# Patient Record
Sex: MALE | Race: BLACK OR AFRICAN AMERICAN | NOT HISPANIC OR LATINO | URBAN - METROPOLITAN AREA
[De-identification: names, ages, dates, MRNs, and addresses within clinical notes are randomized per-mention and may not be internally consistent; named-entity substitution may affect disease eponyms.]

---

## 2024-08-09 ENCOUNTER — APPOINTMENT (OUTPATIENT)
Dept: RADIOLOGY | Facility: HOSPITAL | Age: 64
End: 2024-08-09
Payer: COMMERCIAL

## 2024-08-09 ENCOUNTER — HOSPITAL ENCOUNTER (EMERGENCY)
Facility: HOSPITAL | Age: 64
Discharge: HOME | End: 2024-08-09
Attending: EMERGENCY MEDICINE
Payer: COMMERCIAL

## 2024-08-09 ENCOUNTER — APPOINTMENT (OUTPATIENT)
Dept: CARDIOLOGY | Facility: HOSPITAL | Age: 64
End: 2024-08-09
Payer: COMMERCIAL

## 2024-08-09 VITALS
WEIGHT: 190 LBS | RESPIRATION RATE: 18 BRPM | SYSTOLIC BLOOD PRESSURE: 149 MMHG | OXYGEN SATURATION: 96 % | HEART RATE: 55 BPM | HEIGHT: 66 IN | DIASTOLIC BLOOD PRESSURE: 70 MMHG | TEMPERATURE: 97 F | BODY MASS INDEX: 30.53 KG/M2

## 2024-08-09 DIAGNOSIS — M25.511 ACUTE PAIN OF RIGHT SHOULDER: Primary | ICD-10-CM

## 2024-08-09 LAB
ALBUMIN SERPL BCP-MCNC: 4 G/DL (ref 3.4–5)
ALP SERPL-CCNC: 86 U/L (ref 33–136)
ALT SERPL W P-5'-P-CCNC: 12 U/L (ref 10–52)
ANION GAP SERPL CALC-SCNC: 12 MMOL/L (ref 10–20)
AST SERPL W P-5'-P-CCNC: 14 U/L (ref 9–39)
ATRIAL RATE: 60 BPM
BASOPHILS # BLD AUTO: 0.09 X10*3/UL (ref 0–0.1)
BASOPHILS NFR BLD AUTO: 0.7 %
BILIRUB SERPL-MCNC: 0.3 MG/DL (ref 0–1.2)
BUN SERPL-MCNC: 11 MG/DL (ref 6–23)
CALCIUM SERPL-MCNC: 9 MG/DL (ref 8.6–10.3)
CARDIAC TROPONIN I PNL SERPL HS: 3 NG/L (ref 0–20)
CHLORIDE SERPL-SCNC: 105 MMOL/L (ref 98–107)
CO2 SERPL-SCNC: 24 MMOL/L (ref 21–32)
CREAT SERPL-MCNC: 0.96 MG/DL (ref 0.5–1.3)
CRP SERPL-MCNC: 1.11 MG/DL
EGFRCR SERPLBLD CKD-EPI 2021: 90 ML/MIN/1.73M*2
EOSINOPHIL # BLD AUTO: 0.22 X10*3/UL (ref 0–0.7)
EOSINOPHIL NFR BLD AUTO: 1.8 %
ERYTHROCYTE [DISTWIDTH] IN BLOOD BY AUTOMATED COUNT: 14.9 % (ref 11.5–14.5)
ERYTHROCYTE [SEDIMENTATION RATE] IN BLOOD BY WESTERGREN METHOD: 15 MM/H (ref 0–20)
GLUCOSE SERPL-MCNC: 119 MG/DL (ref 74–99)
HCT VFR BLD AUTO: 46.6 % (ref 41–52)
HGB BLD-MCNC: 15.6 G/DL (ref 13.5–17.5)
IMM GRANULOCYTES # BLD AUTO: 0.03 X10*3/UL (ref 0–0.7)
IMM GRANULOCYTES NFR BLD AUTO: 0.2 % (ref 0–0.9)
LYMPHOCYTES # BLD AUTO: 4.43 X10*3/UL (ref 1.2–4.8)
LYMPHOCYTES NFR BLD AUTO: 36.7 %
MCH RBC QN AUTO: 29.2 PG (ref 26–34)
MCHC RBC AUTO-ENTMCNC: 33.5 G/DL (ref 32–36)
MCV RBC AUTO: 87 FL (ref 80–100)
MONOCYTES # BLD AUTO: 0.72 X10*3/UL (ref 0.1–1)
MONOCYTES NFR BLD AUTO: 6 %
NEUTROPHILS # BLD AUTO: 6.58 X10*3/UL (ref 1.2–7.7)
NEUTROPHILS NFR BLD AUTO: 54.6 %
NRBC BLD-RTO: 0 /100 WBCS (ref 0–0)
P AXIS: 57 DEGREES
P OFFSET: 188 MS
P ONSET: 129 MS
PLATELET # BLD AUTO: 244 X10*3/UL (ref 150–450)
POTASSIUM SERPL-SCNC: 3.9 MMOL/L (ref 3.5–5.3)
PR INTERVAL: 182 MS
PROT SERPL-MCNC: 6.6 G/DL (ref 6.4–8.2)
Q ONSET: 220 MS
QRS COUNT: 10 BEATS
QRS DURATION: 82 MS
QT INTERVAL: 394 MS
QTC CALCULATION(BAZETT): 394 MS
QTC FREDERICIA: 394 MS
R AXIS: 79 DEGREES
RBC # BLD AUTO: 5.35 X10*6/UL (ref 4.5–5.9)
SODIUM SERPL-SCNC: 137 MMOL/L (ref 136–145)
T AXIS: 61 DEGREES
T OFFSET: 417 MS
VENTRICULAR RATE: 60 BPM
WBC # BLD AUTO: 12.1 X10*3/UL (ref 4.4–11.3)

## 2024-08-09 PROCEDURE — 85025 COMPLETE CBC W/AUTO DIFF WBC: CPT | Performed by: PHYSICIAN ASSISTANT

## 2024-08-09 PROCEDURE — 2500000001 HC RX 250 WO HCPCS SELF ADMINISTERED DRUGS (ALT 637 FOR MEDICARE OP): Performed by: PHYSICIAN ASSISTANT

## 2024-08-09 PROCEDURE — 73030 X-RAY EXAM OF SHOULDER: CPT | Mod: RT

## 2024-08-09 PROCEDURE — 84075 ASSAY ALKALINE PHOSPHATASE: CPT | Performed by: PHYSICIAN ASSISTANT

## 2024-08-09 PROCEDURE — 99285 EMERGENCY DEPT VISIT HI MDM: CPT | Mod: 25

## 2024-08-09 PROCEDURE — 2500000002 HC RX 250 W HCPCS SELF ADMINISTERED DRUGS (ALT 637 FOR MEDICARE OP, ALT 636 FOR OP/ED): Performed by: PHYSICIAN ASSISTANT

## 2024-08-09 PROCEDURE — 86140 C-REACTIVE PROTEIN: CPT | Performed by: PHYSICIAN ASSISTANT

## 2024-08-09 PROCEDURE — 72125 CT NECK SPINE W/O DYE: CPT

## 2024-08-09 PROCEDURE — 72125 CT NECK SPINE W/O DYE: CPT | Performed by: RADIOLOGY

## 2024-08-09 PROCEDURE — 96374 THER/PROPH/DIAG INJ IV PUSH: CPT

## 2024-08-09 PROCEDURE — 73030 X-RAY EXAM OF SHOULDER: CPT | Mod: RIGHT SIDE | Performed by: RADIOLOGY

## 2024-08-09 PROCEDURE — 84484 ASSAY OF TROPONIN QUANT: CPT | Performed by: PHYSICIAN ASSISTANT

## 2024-08-09 PROCEDURE — 2500000004 HC RX 250 GENERAL PHARMACY W/ HCPCS (ALT 636 FOR OP/ED): Performed by: PHYSICIAN ASSISTANT

## 2024-08-09 PROCEDURE — 85652 RBC SED RATE AUTOMATED: CPT | Performed by: PHYSICIAN ASSISTANT

## 2024-08-09 PROCEDURE — 93005 ELECTROCARDIOGRAM TRACING: CPT

## 2024-08-09 PROCEDURE — 36415 COLL VENOUS BLD VENIPUNCTURE: CPT | Performed by: PHYSICIAN ASSISTANT

## 2024-08-09 RX ORDER — OXYCODONE AND ACETAMINOPHEN 5; 325 MG/1; MG/1
1 TABLET ORAL ONCE
Status: COMPLETED | OUTPATIENT
Start: 2024-08-09 | End: 2024-08-09

## 2024-08-09 RX ORDER — LIDOCAINE 560 MG/1
1 PATCH PERCUTANEOUS; TOPICAL; TRANSDERMAL
COMMUNITY
Start: 2024-08-08 | End: 2024-08-13

## 2024-08-09 RX ORDER — OMEPRAZOLE 20 MG/1
20 CAPSULE, DELAYED RELEASE ORAL
COMMUNITY

## 2024-08-09 RX ORDER — KETOROLAC TROMETHAMINE 30 MG/ML
15 INJECTION, SOLUTION INTRAMUSCULAR; INTRAVENOUS ONCE
Status: DISCONTINUED | OUTPATIENT
Start: 2024-08-09 | End: 2024-08-09

## 2024-08-09 RX ORDER — HYDROXYZINE HYDROCHLORIDE 25 MG/1
25 TABLET, FILM COATED ORAL 2 TIMES DAILY PRN
COMMUNITY
Start: 2022-10-06

## 2024-08-09 RX ORDER — AMLODIPINE BESYLATE 10 MG/1
10 TABLET ORAL DAILY
COMMUNITY

## 2024-08-09 RX ORDER — TAMSULOSIN HYDROCHLORIDE 0.4 MG/1
0.4 CAPSULE ORAL DAILY
COMMUNITY

## 2024-08-09 RX ORDER — CYCLOBENZAPRINE HCL 10 MG
10 TABLET ORAL ONCE
Status: COMPLETED | OUTPATIENT
Start: 2024-08-09 | End: 2024-08-09

## 2024-08-09 RX ORDER — PRAVASTATIN SODIUM 40 MG/1
40 TABLET ORAL DAILY
COMMUNITY

## 2024-08-09 RX ORDER — ASPIRIN 81 MG/1
81 TABLET ORAL DAILY
COMMUNITY

## 2024-08-09 RX ORDER — CYCLOBENZAPRINE HCL 10 MG
10 TABLET ORAL EVERY 8 HOURS PRN
COMMUNITY
Start: 2024-08-08 | End: 2024-08-13

## 2024-08-09 RX ORDER — DIAZEPAM 5 MG/1
5 TABLET ORAL ONCE
Status: COMPLETED | OUTPATIENT
Start: 2024-08-09 | End: 2024-08-09

## 2024-08-09 RX ORDER — DULOXETIN HYDROCHLORIDE 60 MG/1
60 CAPSULE, DELAYED RELEASE ORAL
COMMUNITY

## 2024-08-09 RX ORDER — OXYCODONE AND ACETAMINOPHEN 5; 325 MG/1; MG/1
1 TABLET ORAL EVERY 6 HOURS PRN
Qty: 5 TABLET | Refills: 0 | Status: SHIPPED | OUTPATIENT
Start: 2024-08-09 | End: 2024-08-12

## 2024-08-09 RX ORDER — CARVEDILOL 25 MG/1
25 TABLET ORAL 2 TIMES DAILY
COMMUNITY

## 2024-08-09 ASSESSMENT — PAIN - FUNCTIONAL ASSESSMENT
PAIN_FUNCTIONAL_ASSESSMENT: 0-10
PAIN_FUNCTIONAL_ASSESSMENT: 0-10

## 2024-08-09 ASSESSMENT — COLUMBIA-SUICIDE SEVERITY RATING SCALE - C-SSRS
1. IN THE PAST MONTH, HAVE YOU WISHED YOU WERE DEAD OR WISHED YOU COULD GO TO SLEEP AND NOT WAKE UP?: NO
2. HAVE YOU ACTUALLY HAD ANY THOUGHTS OF KILLING YOURSELF?: NO
6. HAVE YOU EVER DONE ANYTHING, STARTED TO DO ANYTHING, OR PREPARED TO DO ANYTHING TO END YOUR LIFE?: NO

## 2024-08-09 ASSESSMENT — PAIN DESCRIPTION - PROGRESSION: CLINICAL_PROGRESSION: GRADUALLY IMPROVING

## 2024-08-09 ASSESSMENT — HEART SCORE
AGE: 45-64
HISTORY: SLIGHTLY SUSPICIOUS
ECG: NORMAL
RISK FACTORS: 1-2 RISK FACTORS
HEART SCORE: 2
TROPONIN: LESS THAN OR EQUAL TO NORMAL LIMIT

## 2024-08-09 ASSESSMENT — PAIN DESCRIPTION - LOCATION
LOCATION: NECK
LOCATION: SHOULDER

## 2024-08-09 ASSESSMENT — PAIN SCALES - GENERAL
PAINLEVEL_OUTOF10: 5 - MODERATE PAIN
PAINLEVEL_OUTOF10: 3

## 2024-08-09 NOTE — DISCHARGE INSTRUCTIONS
Follow up with orthopedic surgery clinic for right shoulder pain   Take percocet only for a short period of time in additional to multi-modal pain therapy with heat, flexeril (muscle relaxant), and lidocaine pain patch

## 2024-08-09 NOTE — ED PROVIDER NOTES
HPI     CC: Shoulder Pain (Right sided shoulder pain that radiates into posterior neck x2 days)     HPI: Brice Grant is a 63 y.o. male with past medical history of hypertension, hyperlipidemia, BPH, history of GI bleed, and depression presents with concern for atraumatic right neck and right shoulder pain.  Patient reports that it started 2 days ago and woke him from his sleep.  He has never experienced anything like this in the past.  He reports a shooting pain local to the right shoulder that does not extend past the shoulder.  He does report significant discomfort with head movement.  States he has never injured this area before.  Denies chest pain or shortness of breath.  He has tried over-the-counter medications and lidocaine patches without relief.    ROS: 10-point review of systems was performed and is otherwise negative except as noted in HPI.      Past Medical History: Noncontributory except per HPI     Past Surgical History: Noncontributory except per HPI     Family History: Reviewed and noncontributory     Social History:  Noncontributory except per HPI       Not on File    No past medical history on file.    Home Meds:   Current Outpatient Medications   Medication Instructions    amLODIPine (NORVASC) 10 mg, oral, Daily    aspirin 81 mg, oral, Daily    carvedilol (COREG) 25 mg, oral, 2 times daily    cyclobenzaprine (FLEXERIL) 10 mg, oral, Every 8 hours PRN    DULoxetine (CYMBALTA) 60 mg, oral    hydrOXYzine HCL (ATARAX) 25 mg, oral, 2 times daily PRN    lidocaine 4 % patch 1 patch, transdermal, Daily RT    omeprazole (PRILOSEC) 20 mg, oral    pravastatin (PRAVACHOL) 40 mg, oral, Daily    tamsulosin (FLOMAX) 0.4 mg, oral, Daily        ED Triage Vitals [08/09/24 0337]   Temperature Heart Rate Respirations BP   36.1 °C (97 °F) 63 18 (!) 142/91      Pulse Ox Temp Source Heart Rate Source Patient Position   96 % Temporal -- Sitting      BP Location FiO2 (%)     Left arm --         Heart Rate:  [63]  "  Temperature:  [36.1 °C (97 °F)]   Respirations:  [18]   BP: (142)/(91)   Height:  [167.6 cm (5' 6\")]   Weight:  [86.2 kg (190 lb)]   Pulse Ox:  [96 %]      Physical Exam:  Physical Exam  Vitals and nursing note reviewed.   Constitutional:       General: He is not in acute distress.     Appearance: Normal appearance. He is not ill-appearing.   HENT:      Head: Normocephalic and atraumatic.      Right Ear: External ear normal.      Left Ear: External ear normal.      Nose: Nose normal.      Mouth/Throat:      Mouth: Mucous membranes are moist.   Eyes:      Extraocular Movements: Extraocular movements intact.      Conjunctiva/sclera: Conjunctivae normal.      Pupils: Pupils are equal, round, and reactive to light.   Neck:      Comments: Midline cervical spine tenderness radiating to the right trapezius muscle and right shoulder.  No redness or warmth.  Slight decreased range of motion with moving left and right but not with up-and-down movements.  Patient has a tape rash from lidocaine patch that was taped on the area but no other redness or warmth.  Unable to complete strength testing of the right upper extremity as the patient is unwilling to complete due to pain.  He is able to move shoulder to above his head with some difficulty due to pain.  2+ radial pulse bilaterally.  No sensory deficit at the level of the hands or proximally.  Cardiovascular:      Rate and Rhythm: Normal rate and regular rhythm.      Pulses: Normal pulses.   Pulmonary:      Effort: Pulmonary effort is normal. No respiratory distress.      Breath sounds: Normal breath sounds.   Abdominal:      General: Abdomen is flat.      Palpations: Abdomen is soft.      Tenderness: There is no abdominal tenderness.   Musculoskeletal:         General: Normal range of motion.      Cervical back: Normal range of motion and neck supple.   Skin:     General: Skin is warm and dry.      Capillary Refill: Capillary refill takes less than 2 seconds. "   Neurological:      General: No focal deficit present.      Mental Status: He is alert and oriented to person, place, and time.   Psychiatric:         Mood and Affect: Mood normal.          Diagnostic Results        Labs Reviewed   CBC WITH AUTO DIFFERENTIAL - Abnormal       Result Value    WBC 12.1 (*)     nRBC 0.0      RBC 5.35      Hemoglobin 15.6      Hematocrit 46.6      MCV 87      MCH 29.2      MCHC 33.5      RDW 14.9 (*)     Platelets 244      Neutrophils % 54.6      Immature Granulocytes %, Automated 0.2      Lymphocytes % 36.7      Monocytes % 6.0      Eosinophils % 1.8      Basophils % 0.7      Neutrophils Absolute 6.58      Immature Granulocytes Absolute, Automated 0.03      Lymphocytes Absolute 4.43      Monocytes Absolute 0.72      Eosinophils Absolute 0.22      Basophils Absolute 0.09     COMPREHENSIVE METABOLIC PANEL - Abnormal    Glucose 119 (*)     Sodium 137      Potassium 3.9      Chloride 105      Bicarbonate 24      Anion Gap 12      Urea Nitrogen 11      Creatinine 0.96      eGFR 90      Calcium 9.0      Albumin 4.0      Alkaline Phosphatase 86      Total Protein 6.6      AST 14      Bilirubin, Total 0.3      ALT 12     C-REACTIVE PROTEIN - Abnormal    C-Reactive Protein 1.11 (*)    TROPONIN I, HIGH SENSITIVITY - Normal    Troponin I, High Sensitivity 3      Narrative:     Less than 99th percentile of normal range cutoff-  Female and children under 18 years old <14 ng/L; Male <21 ng/L: Negative  Repeat testing should be performed if clinically indicated.     Female and children under 18 years old 14-50 ng/L; Male 21-50 ng/L:  Consistent with possible cardiac damage and possible increased clinical   risk. Serial measurements may help to assess extent of myocardial damage.     >50 ng/L: Consistent with cardiac damage, increased clinical risk and  myocardial infarction. Serial measurements may help assess extent of   myocardial damage.      NOTE: Children less than 1 year old may have higher  baseline troponin   levels and results should be interpreted in conjunction with the overall   clinical context.     NOTE: Troponin I testing is performed using a different   testing methodology at [facility] than at other   [facility] hospitals. Direct result comparisons should only   be made within the same method.   SEDIMENTATION RATE, AUTOMATED - Normal    Sedimentation Rate 15           XR shoulder right 2+ views   Final Result   No acute osseous abnormality             MACRO:   None.        Signed by: Evan Finkelstein 8/9/2024 5:41 AM   Dictation workstation:   [id number]      CT cervical spine wo IV contrast   Final Result   Cervical spondylosis without acute loss of vertebral body height or   traumatic malalignment.             MACRO:   None.        Signed by: Evan Finkelstein 8/9/2024 4:52 AM   Dictation workstation:   [id number]                    Brown Coma Scale Score: 15   HEART Score: 2                Procedure  Procedures    ED Course & MDM   Assessment/Plan:     Medications   diazePAM (Valium) tablet 5 mg (has no administration in time range)   oxyCODONE-acetaminophen (Percocet) 5-325 mg per tablet 1 tablet (has no administration in time range)   cyclobenzaprine (Flexeril) tablet 10 mg (10 mg oral Given 8/9/24 0438)   HYDROmorphone (Dilaudid) injection 0.5 mg (0.5 mg intravenous Given 8/9/24 0438)        ED Course as of 08/09/24 0549   Fri Aug 09, 2024   0509 EKG on my interpretation shows normal sinus rhythm at a rate of 60 bpm, no acute ST changes when compared to prior, QTc 394. [EP]   0545 Patient seen and evaluated along with MEDHAT, I have seen and independently interpreted the EKG as above and agree with the documentation.  Patient is presenting with right-sided shoulder discomfort.  On exam the patient is awake and alert, no signs of distress.  Has reproducible tenderness to palpation over his right trapezius.  Patient has full range of motion in his right upper extremity, is  neurovascularly intact.  Tenderness is reproducible over his trapezius, no midline tenderness, denies any fevers or chills.  Was started on pain medication and muscle relaxers without significant improvement.  Has a history of GI bleeds therefore we will hold on anti-inflammatories. [LP]   0546   Will trial Percocet as well as Valium for improve muscle relaxation and reevaluate.  Ridging obtained in the ED overall unremarkable. [LP]      ED Course User Index  [EP] Nancy Barth PA-C  [LP] Erika Serrano DO       Medical Decision Making    Brice Grant is a 63 y.o. male with past medical history of hypertension, hyperlipidemia, BPH, history of GI bleed, and depression presents with concern for atraumatic right neck and right shoulder pain.  Patient is nontoxic-appearing and vital signs are normal.  He does appear acutely uncomfortable and is writhing in the bed and diaphoretic.  Upon chart review, patient does have a history of marijuana and cocaine use.  He was also seen yesterday in a [facility] ER for the same symptoms.  No workup was completed as they felt it was a muscle strain.  Patient was given Tylenol, Flexeril, and Lidoderm patches.  Because he presents today with acutely worsening pain, will workup for ACS, shoulder injury, neck injury or nerve entrapment, or possible inflammatory condition with lab work.  Patient will be given Flexeril in the emergency department and will avoid NSAIDs due to history of GI bleed.  Will also give Dilaudid for pain control while workup is being completed. Troponin less than 3.  CMP unremarkable.  CRP mildly elevated at 1.11.  Sed rate normal.  CBC with leukocytosis of 12.1 but no left shift.  X-ray of the shoulder was normal.  CT C-spine shows cervical spondylosis without any traumatic alignment.  Patient was reevaluated with attending and pain was still severe but reproducible.  Will trial Percocet and Valium and reevaluate pain control.  Heart score 2.  Seen  with Dr. Serrano.  Signed out to Dr. Pop.    Disposition: Home    ED Prescriptions    None         Social Determinants Affecting Care: none     Nancy Barth PA-C    This note was dictated by speech recognition. Minor errors in transcription may be present.     Nancy Barth PA-C  08/09/24 0549

## 2024-08-09 NOTE — ED TRIAGE NOTES
"Pt arrived to ED with fiance. Pt reports right shoulder pain x2 days that radiates up the back of the neck. Pt had a lidocaine patch on prior to arrival but states it did not relieve the pain. Pt denies injury to the area stating \"I just woke up feeling like this and thought it was going to go away.\"   "

## 2024-08-30 NOTE — PROGRESS NOTES
Emergency Medicine Transition of Care Note.    I received Brice Grant in signout from Dr. Serrano.  Please see the previous ED provider note for all HPI, PE and MDM up to the time of signout at 0600. This is in addition to the primary record.    In brief Brice Grant is an 63 y.o. male presenting for   Chief Complaint   Patient presents with    Shoulder Pain     Right sided shoulder pain that radiates into posterior neck x2 days     At the time of signout we were awaiting: response to pain meds.  Pt improved.  Will dc with short course of percocet, fu with orthopedics.     ED Course as of 08/30/24 0836   Fri Aug 09, 2024   0509 EKG on my interpretation shows normal sinus rhythm at a rate of 60 bpm, no acute ST changes when compared to prior, QTc 394. [EP]   0545 Patient seen and evaluated along with MEDHAT, I have seen and independently interpreted the EKG as above and agree with the documentation.  Patient is presenting with right-sided shoulder discomfort.  On exam the patient is awake and alert, no signs of distress.  Has reproducible tenderness to palpation over his right trapezius.  Patient has full range of motion in his right upper extremity, is neurovascularly intact.  Tenderness is reproducible over his trapezius, no midline tenderness, denies any fevers or chills.  Was started on pain medication and muscle relaxers without significant improvement.  Has a history of GI bleeds therefore we will hold on anti-inflammatories. [LP]   0546   Will trial Percocet as well as Valium for improve muscle relaxation and reevaluate.  Ridging obtained in the ED overall unremarkable. [LP]      ED Course User Index  [EP] Nancy Barth PA-C  [LP] Erika Serrano,          Diagnoses as of 08/30/24 0836   Acute pain of right shoulder       Medical Decision Making      Final diagnoses:   [M25.511] Acute pain of right shoulder           Procedure  Procedures    Madie Pop MD

## 2024-10-11 ENCOUNTER — OFFICE VISIT (OUTPATIENT)
Dept: URGENT CARE | Age: 64
End: 2024-10-11
Payer: COMMERCIAL

## 2024-10-11 VITALS
WEIGHT: 185 LBS | HEART RATE: 70 BPM | RESPIRATION RATE: 16 BRPM | BODY MASS INDEX: 29.86 KG/M2 | SYSTOLIC BLOOD PRESSURE: 117 MMHG | OXYGEN SATURATION: 95 % | TEMPERATURE: 98.5 F | DIASTOLIC BLOOD PRESSURE: 71 MMHG
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
## 2024-10-11 DIAGNOSIS — S80.862A INSECT BITE (NONVENOMOUS), LEFT LOWER LEG, INITIAL ENCOUNTER: Primary | ICD-10-CM

## 2024-10-11 RX ORDER — METHYLPREDNISOLONE 4 MG/1
TABLET ORAL
Qty: 21 TABLET | Refills: 0 | Status: SHIPPED | OUTPATIENT
Start: 2024-10-11

## 2024-10-11 ASSESSMENT — ENCOUNTER SYMPTOMS
FREQUENCY: 0
SINUS PRESSURE: 0
PALPITATIONS: 0
CHEST TIGHTNESS: 0
FEVER: 0
WHEEZING: 0
SHORTNESS OF BREATH: 0
ABDOMINAL PAIN: 0
SORE THROAT: 0
DIARRHEA: 0
HEADACHES: 0
VOMITING: 0
CHILLS: 0
NAUSEA: 0
RHINORRHEA: 0
COUGH: 0
CONSTIPATION: 0
DYSURIA: 0

## 2024-10-11 ASSESSMENT — PAIN SCALES - GENERAL: PAINLEVEL: 0-NO PAIN

## 2024-10-11 NOTE — PROGRESS NOTES
"Subjective   Patient ID: Brice Grant is a 63 y.o. male.    HPI: 63-year-old male presents with complaint of pruritic \"bites\" on lower legs bilaterally.  Greater on left than right.  Reports these have been present for about 2 weeks.  He denies any recent outdoor activities or exposure to domestic animals.  Wife does not have any lesions that are similar.   and wife are both concerned about possible fleas, bedbugs, ticks, but recent activities and exposures do not suggest any of these causes for these pruritic marks.                History provided by:  Patient and spouse   used: No        The following portions of the chart were reviewed this encounter and updated as appropriate:  Tobacco  Allergies  Meds  Problems  Med Hx  Surg Hx  Fam Hx         Review of Systems   Constitutional:  Negative for chills and fever.   HENT:  Negative for congestion, ear pain, rhinorrhea, sinus pressure and sore throat.    Respiratory:  Negative for cough, chest tightness, shortness of breath and wheezing.    Cardiovascular:  Negative for palpitations.   Gastrointestinal:  Negative for abdominal pain, constipation, diarrhea, nausea and vomiting.   Genitourinary:  Negative for dysuria and frequency.   Skin:  Positive for rash.   Neurological:  Negative for headaches.     Objective   Vital signs are reviewed. Alert and oriented x3 with normal mood and affect  Patient is well nourished, well-developed, alert and in no acute distress    External eyes, orbits, conjunctiva and eyelids are normal in appearance  Pupils are equal, round, reactive to light and accommodation, extraocular movements intact    External ears appear normal  External canals are normal in appearance  Right tympanic membrane is intact and pale gray in appearance  Left tympanic membrane is intact and pale gray in appearance  There is no middle ear effusion noted on the right  There is no middle ear effusion noted on the left  External " appearance of the nose is normal  Nasal mucosa, septum, turbinates are mildly reddened and slightly swollen in appearance  There is thin white posterior pharynx nasal discharge in both nares    Oral mucosa is uniformly pink and moist  Palate is pink, symmetric and intact  Tongue is moist, mobile and midline  Posterior pharynx not erythematous with no concretions or exudates present  No cervical lymphadenopathy palpated    Heart has regular rate and rhythm. No murmurs, rubs or gallops are auscultated at this exam.    Respiratory rate rhythm and effort are normal. Breath sounds bilaterally are clear on auscultation without crackles, rhonchi, wheezes or friction rub.    Abdomen: Normal bowel sounds on auscultation. Soft, nontender without rebound or rigidity on palpation    Extremities: Legs: Patient has small red slightly raised 2 to 3 mm lesions on the ankles bilaterally.  He states they are intensely pruritic.  No visible tics.  Bite is not typical for bedbugs.  No reason for exposure to fleas or other arthropod's.  Wife is completely unaffected.  Procedures    Assessment/Plan   Diagnoses and all orders for this visit:  Insect bite (nonvenomous), left lower leg, initial encounter  -     methylPREDNISolone (Medrol Dospak) 4 mg tablets; Please take one row of tabs early in the day with food each day    Patient disposition: Home

## 2024-10-11 NOTE — PATIENT INSTRUCTIONS
You have multiple skin lesions suspicious for some sort of insect bite.  As we discussed it is unlikely that this is fleas, ticks, spiders.  Please increase your oral fluids for the next 7-10 days  Please take Medrol as a single dose early in the day with food  May apply calamine to affected area for additional relief of itching.  May use Allegra or Zyrtec for additional relief of itching.  If new lesions are found or if condition does not resolve over the next 5 to 7 days please follow-up with dermatologist.  If fever greater than 102 degrees F, chills, nausea, vomiting, increased redness, increased pain, induration, drainage, crusting, purulent discharge please go to emergency department for further evaluation of this problem    This note was generated by voice recognition software. Minor transcription/grammatical errors may be present. Please call for clarification.